# Patient Record
Sex: FEMALE | Race: ASIAN | Employment: FULL TIME | URBAN - METROPOLITAN AREA
[De-identification: names, ages, dates, MRNs, and addresses within clinical notes are randomized per-mention and may not be internally consistent; named-entity substitution may affect disease eponyms.]

---

## 2020-07-09 ENCOUNTER — HOSPITAL ENCOUNTER (OUTPATIENT)
Dept: PREADMISSION TESTING | Age: 50
Discharge: HOME | End: 2020-07-09
Payer: COMMERCIAL

## 2020-07-09 PROCEDURE — 87635 SARS-COV-2 COVID-19 AMP PRB: CPT

## 2020-07-10 ENCOUNTER — ANESTHESIA EVENT (OUTPATIENT)
Dept: SURGERY | Age: 50
End: 2020-07-10
Payer: COMMERCIAL

## 2020-07-11 LAB — SARS-COV-2, COV2NT: NOT DETECTED

## 2020-07-13 ENCOUNTER — HOSPITAL ENCOUNTER (OUTPATIENT)
Age: 50
Setting detail: OUTPATIENT SURGERY
Discharge: HOME | End: 2020-07-13
Attending: OBSTETRICS & GYNECOLOGY | Admitting: OBSTETRICS & GYNECOLOGY
Payer: COMMERCIAL

## 2020-07-13 ENCOUNTER — ANESTHESIA (OUTPATIENT)
Dept: SURGERY | Age: 50
End: 2020-07-13
Payer: COMMERCIAL

## 2020-07-13 VITALS
DIASTOLIC BLOOD PRESSURE: 69 MMHG | OXYGEN SATURATION: 100 % | BODY MASS INDEX: 26.45 KG/M2 | WEIGHT: 149.25 LBS | RESPIRATION RATE: 16 BRPM | HEART RATE: 63 BPM | SYSTOLIC BLOOD PRESSURE: 120 MMHG | HEIGHT: 63 IN | TEMPERATURE: 96.5 F

## 2020-07-13 PROBLEM — R93.89 THICKENED ENDOMETRIUM: Status: ACTIVE | Noted: 2020-07-13

## 2020-07-13 LAB — HCG UR QL: NEGATIVE

## 2020-07-13 PROCEDURE — 81025 URINE PREGNANCY TEST: CPT

## 2020-07-13 PROCEDURE — 74011250637 HC RX REV CODE- 250/637: Performed by: SPECIALIST

## 2020-07-13 PROCEDURE — 77030012508 HC MSK AIRWY LMA AMBU -A: Performed by: SPECIALIST

## 2020-07-13 PROCEDURE — 88305 TISSUE EXAM BY PATHOLOGIST: CPT

## 2020-07-13 PROCEDURE — 74011000250 HC RX REV CODE- 250: Performed by: OBSTETRICS & GYNECOLOGY

## 2020-07-13 PROCEDURE — 74011250636 HC RX REV CODE- 250/636: Performed by: OBSTETRICS & GYNECOLOGY

## 2020-07-13 PROCEDURE — 77030040361 HC SLV COMPR DVT MDII -B: Performed by: OBSTETRICS & GYNECOLOGY

## 2020-07-13 PROCEDURE — 77030020782 HC GWN BAIR PAWS FLX 3M -B: Performed by: OBSTETRICS & GYNECOLOGY

## 2020-07-13 PROCEDURE — 74011000250 HC RX REV CODE- 250: Performed by: SPECIALIST

## 2020-07-13 PROCEDURE — 76060000032 HC ANESTHESIA 0.5 TO 1 HR: Performed by: OBSTETRICS & GYNECOLOGY

## 2020-07-13 PROCEDURE — 74011250636 HC RX REV CODE- 250/636: Performed by: SPECIALIST

## 2020-07-13 PROCEDURE — 77030018836 HC SOL IRR NACL ICUM -A: Performed by: OBSTETRICS & GYNECOLOGY

## 2020-07-13 PROCEDURE — 76010000138 HC OR TIME 0.5 TO 1 HR: Performed by: OBSTETRICS & GYNECOLOGY

## 2020-07-13 PROCEDURE — 77030019927 HC TBNG IRR CYSTO BAXT -A: Performed by: OBSTETRICS & GYNECOLOGY

## 2020-07-13 PROCEDURE — 76210000006 HC OR PH I REC 0.5 TO 1 HR: Performed by: OBSTETRICS & GYNECOLOGY

## 2020-07-13 PROCEDURE — 76210000021 HC REC RM PH II 0.5 TO 1 HR: Performed by: OBSTETRICS & GYNECOLOGY

## 2020-07-13 RX ORDER — MAGNESIUM SULFATE 100 %
4 CRYSTALS MISCELLANEOUS AS NEEDED
Status: DISCONTINUED | OUTPATIENT
Start: 2020-07-13 | End: 2020-07-13 | Stop reason: HOSPADM

## 2020-07-13 RX ORDER — SODIUM CHLORIDE 9 MG/ML
125 INJECTION, SOLUTION INTRAVENOUS CONTINUOUS
Status: DISCONTINUED | OUTPATIENT
Start: 2020-07-13 | End: 2020-07-13 | Stop reason: HOSPADM

## 2020-07-13 RX ORDER — OXYCODONE AND ACETAMINOPHEN 5; 325 MG/1; MG/1
1 TABLET ORAL AS NEEDED
Status: DISCONTINUED | OUTPATIENT
Start: 2020-07-13 | End: 2020-07-13 | Stop reason: HOSPADM

## 2020-07-13 RX ORDER — KETOROLAC TROMETHAMINE 30 MG/ML
30 INJECTION, SOLUTION INTRAMUSCULAR; INTRAVENOUS
Status: DISCONTINUED | OUTPATIENT
Start: 2020-07-13 | End: 2020-07-13 | Stop reason: HOSPADM

## 2020-07-13 RX ORDER — FENTANYL CITRATE 50 UG/ML
50 INJECTION, SOLUTION INTRAMUSCULAR; INTRAVENOUS
Status: DISCONTINUED | OUTPATIENT
Start: 2020-07-13 | End: 2020-07-13 | Stop reason: HOSPADM

## 2020-07-13 RX ORDER — ACETAMINOPHEN 500 MG
1000 TABLET ORAL ONCE
Status: COMPLETED | OUTPATIENT
Start: 2020-07-13 | End: 2020-07-13

## 2020-07-13 RX ORDER — KETAMINE HCL IN 0.9 % NACL 50 MG/5 ML
SYRINGE (ML) INTRAVENOUS AS NEEDED
Status: DISCONTINUED | OUTPATIENT
Start: 2020-07-13 | End: 2020-07-13 | Stop reason: HOSPADM

## 2020-07-13 RX ORDER — MIDAZOLAM HYDROCHLORIDE 1 MG/ML
INJECTION, SOLUTION INTRAMUSCULAR; INTRAVENOUS AS NEEDED
Status: DISCONTINUED | OUTPATIENT
Start: 2020-07-13 | End: 2020-07-13 | Stop reason: HOSPADM

## 2020-07-13 RX ORDER — ONDANSETRON 2 MG/ML
4 INJECTION INTRAMUSCULAR; INTRAVENOUS ONCE
Status: COMPLETED | OUTPATIENT
Start: 2020-07-13 | End: 2020-07-13

## 2020-07-13 RX ORDER — PROPOFOL 10 MG/ML
INJECTION, EMULSION INTRAVENOUS AS NEEDED
Status: DISCONTINUED | OUTPATIENT
Start: 2020-07-13 | End: 2020-07-13 | Stop reason: HOSPADM

## 2020-07-13 RX ORDER — DEXTROSE MONOHYDRATE 100 MG/ML
125-250 INJECTION, SOLUTION INTRAVENOUS AS NEEDED
Status: DISCONTINUED | OUTPATIENT
Start: 2020-07-13 | End: 2020-07-13 | Stop reason: HOSPADM

## 2020-07-13 RX ORDER — LIDOCAINE HYDROCHLORIDE 20 MG/ML
INJECTION, SOLUTION EPIDURAL; INFILTRATION; INTRACAUDAL; PERINEURAL AS NEEDED
Status: DISCONTINUED | OUTPATIENT
Start: 2020-07-13 | End: 2020-07-13 | Stop reason: HOSPADM

## 2020-07-13 RX ORDER — SODIUM CHLORIDE 0.9 % (FLUSH) 0.9 %
5-40 SYRINGE (ML) INJECTION EVERY 8 HOURS
Status: CANCELLED | OUTPATIENT
Start: 2020-07-13

## 2020-07-13 RX ORDER — FENTANYL CITRATE 50 UG/ML
25 INJECTION, SOLUTION INTRAMUSCULAR; INTRAVENOUS AS NEEDED
Status: DISCONTINUED | OUTPATIENT
Start: 2020-07-13 | End: 2020-07-13 | Stop reason: HOSPADM

## 2020-07-13 RX ORDER — CELECOXIB 100 MG/1
100 CAPSULE ORAL ONCE
Status: COMPLETED | OUTPATIENT
Start: 2020-07-13 | End: 2020-07-13

## 2020-07-13 RX ORDER — SODIUM CHLORIDE 0.9 % (FLUSH) 0.9 %
5-40 SYRINGE (ML) INJECTION AS NEEDED
Status: CANCELLED | OUTPATIENT
Start: 2020-07-13

## 2020-07-13 RX ORDER — NALOXONE HYDROCHLORIDE 0.4 MG/ML
0.1 INJECTION, SOLUTION INTRAMUSCULAR; INTRAVENOUS; SUBCUTANEOUS AS NEEDED
Status: DISCONTINUED | OUTPATIENT
Start: 2020-07-13 | End: 2020-07-13 | Stop reason: HOSPADM

## 2020-07-13 RX ORDER — HYDROMORPHONE HYDROCHLORIDE 1 MG/ML
0.2 INJECTION, SOLUTION INTRAMUSCULAR; INTRAVENOUS; SUBCUTANEOUS
Status: DISCONTINUED | OUTPATIENT
Start: 2020-07-13 | End: 2020-07-13 | Stop reason: HOSPADM

## 2020-07-13 RX ORDER — GLYCOPYRROLATE 0.2 MG/ML
INJECTION INTRAMUSCULAR; INTRAVENOUS AS NEEDED
Status: DISCONTINUED | OUTPATIENT
Start: 2020-07-13 | End: 2020-07-13 | Stop reason: HOSPADM

## 2020-07-13 RX ORDER — SODIUM CHLORIDE, SODIUM LACTATE, POTASSIUM CHLORIDE, CALCIUM CHLORIDE 600; 310; 30; 20 MG/100ML; MG/100ML; MG/100ML; MG/100ML
125 INJECTION, SOLUTION INTRAVENOUS CONTINUOUS
Status: DISCONTINUED | OUTPATIENT
Start: 2020-07-13 | End: 2020-07-13 | Stop reason: HOSPADM

## 2020-07-13 RX ORDER — DEXAMETHASONE SODIUM PHOSPHATE 4 MG/ML
8 INJECTION, SOLUTION INTRA-ARTICULAR; INTRALESIONAL; INTRAMUSCULAR; INTRAVENOUS; SOFT TISSUE ONCE
Status: COMPLETED | OUTPATIENT
Start: 2020-07-13 | End: 2020-07-13

## 2020-07-13 RX ORDER — SILVER NITRATE 38.21; 12.74 MG/1; MG/1
STICK TOPICAL AS NEEDED
Status: DISCONTINUED | OUTPATIENT
Start: 2020-07-13 | End: 2020-07-13 | Stop reason: HOSPADM

## 2020-07-13 RX ADMIN — LIDOCAINE HYDROCHLORIDE 40 MG: 20 INJECTION, SOLUTION EPIDURAL; INFILTRATION; INTRACAUDAL; PERINEURAL at 12:00

## 2020-07-13 RX ADMIN — SODIUM CHLORIDE, SODIUM LACTATE, POTASSIUM CHLORIDE, AND CALCIUM CHLORIDE 125 ML/HR: 600; 310; 30; 20 INJECTION, SOLUTION INTRAVENOUS at 09:55

## 2020-07-13 RX ADMIN — PROPOFOL 100 MG: 10 INJECTION, EMULSION INTRAVENOUS at 11:58

## 2020-07-13 RX ADMIN — Medication 10 MG: at 11:56

## 2020-07-13 RX ADMIN — ACETAMINOPHEN 1000 MG: 500 TABLET ORAL at 10:01

## 2020-07-13 RX ADMIN — PROPOFOL 20 MG: 10 INJECTION, EMULSION INTRAVENOUS at 11:56

## 2020-07-13 RX ADMIN — PROPOFOL 40 MG: 10 INJECTION, EMULSION INTRAVENOUS at 12:00

## 2020-07-13 RX ADMIN — GLYCOPYRROLATE 0.2 MG: 0.2 INJECTION INTRAMUSCULAR; INTRAVENOUS at 11:48

## 2020-07-13 RX ADMIN — MIDAZOLAM 2 MG: 1 INJECTION INTRAMUSCULAR; INTRAVENOUS at 11:48

## 2020-07-13 RX ADMIN — Medication 10 MG: at 11:58

## 2020-07-13 RX ADMIN — DEXAMETHASONE SODIUM PHOSPHATE 8 MG: 4 INJECTION, SOLUTION INTRAMUSCULAR; INTRAVENOUS at 10:01

## 2020-07-13 RX ADMIN — CELECOXIB 100 MG: 100 CAPSULE ORAL at 10:01

## 2020-07-13 RX ADMIN — LIDOCAINE HYDROCHLORIDE 20 MG: 20 INJECTION, SOLUTION EPIDURAL; INFILTRATION; INTRACAUDAL; PERINEURAL at 11:56

## 2020-07-13 RX ADMIN — LIDOCAINE HYDROCHLORIDE 100 MG: 20 INJECTION, SOLUTION EPIDURAL; INFILTRATION; INTRACAUDAL; PERINEURAL at 11:58

## 2020-07-13 RX ADMIN — ONDANSETRON 4 MG: 2 INJECTION INTRAMUSCULAR; INTRAVENOUS at 12:50

## 2020-07-13 NOTE — DISCHARGE INSTRUCTIONS
Patient Education        9 Things To Do If You've Been Exposed to COVID-19    Stay home. If you've been exposed, you should stay in isolation for 14 days. Don't go to school, work, or public areas. And don't use public transportation, ride-shares, or taxis unless you have no choice. Leave your home only if you need to get medical care. But call the doctor's office first so they know you're coming, and wear a cloth face cover when you go. Call your doctor. Call your doctor or other health professional to let them know that you've been exposed. They might want you to be tested, or they may have other instructions for you. If you become sick, wear a face cover when you are around other people. It can help stop the spread of the virus when you cough or sneeze. Limit contact with people in your home. If possible, stay in a separate bedroom and use a separate bathroom. Avoid contact with pets and other animals. Cover your mouth and nose with a tissue when you cough or sneeze. Then throw it in the trash right away. Wash your hands often, especially after you cough or sneeze. Use soap and water, and scrub for at least 20 seconds. If soap and water aren't available, use an alcohol-based hand . Don't share personal household items. These include bedding, towels, cups and glasses, and eating utensils. Clean and disinfect your home every day. Use household  or disinfectant wipes or sprays. Take special care to clean things that you grab with your hands. These include doorknobs, remote controls, phones, and handles on your refrigerator and microwave. And don't forget countertops, tabletops, bathrooms, and computer keyboards. Current as of: May 8, 2020               Content Version: 12.5  © 2006-2020 [employer]. Care instructions adapted under license by [employer] (which disclaims liability or warranty for this information).  If you have questions about a medical condition or this instruction, always ask your healthcare professional. [address] any warranty or liability for your use of this information. DISCHARGE SUMMARY from Nurse    PATIENT INSTRUCTIONS:    After general anesthesia or intravenous sedation, for 24 hours or while taking prescription Narcotics:  · Limit your activities  · Do not drive and operate hazardous machinery  · Do not make important personal or business decisions  · Do  not drink alcoholic beverages  · If you have not urinated within 8 hours after discharge, please contact your surgeon on call. Report the following to your surgeon:  · Excessive pain, swelling, redness or odor of or around the surgical area  · Temperature over 100.5  · Nausea and vomiting lasting longer than 4 hours or if unable to take medications  · Any signs of decreased circulation or nerve impairment to extremity: change in color, persistent  numbness, tingling, coldness or increase pain  · Any questions    What to do at Home:  [facility] 2 WEEKS  RETURN TO OFFICE IN 2 WEEKS CALL FOR APPT    If you experience any of the following symptoms heavy bleeding, fevers, severe pain, please follow up with dr Beverley Phillips    *  Please give a list of your current medications to your Primary Care Provider. *  Please update this list whenever your medications are discontinued, doses are      changed, or new medications (including over-the-counter products) are added. *  Please carry medication information at all times in case of emergency situations. These are general instructions for a healthy lifestyle:    No smoking/ No tobacco products/ Avoid exposure to second hand smoke  Surgeon General's Warning:  Quitting smoking now greatly reduces serious risk to your health.     Obesity, smoking, and sedentary lifestyle greatly increases your risk for illness    A healthy diet, regular physical exercise & weight monitoring are important for maintaining a healthy lifestyle    You may be retaining fluid if you have a history of heart failure or if you experience any of the following symptoms:  Weight gain of 3 pounds or more overnight or 5 pounds in a week, increased swelling in our hands or feet or shortness of breath while lying flat in bed. Please call your doctor as soon as you notice any of these symptoms; do not wait until your next office visit. The discharge information has been reviewed with the patient and caregiver. The patient and caregiver verbalized understanding. Discharge medications reviewed with the patient and caregiver and appropriate educational materials and side effects teaching were provided.   ___________________________________________________________________________________________________________________________________    Patient armband removed and shredded

## 2020-07-13 NOTE — PERIOP NOTES
Spoke with pt daughter via cell phone, pt states she does not have any pain or nausea and does not have any questions

## 2020-07-13 NOTE — PERIOP NOTES
TRANSFER - OUT REPORT:    Verbal report given to Bahman Santiago RN (name) on Zygmunt Ohm  being transferred to phase 2(unit) for routine post - op       Report consisted of patients Situation, Background, Assessment and   Recommendations(SBAR). Information from the following report(s) SBAR, Intake/Output and MAR was reviewed with the receiving nurse. Lines:   Peripheral IV 07/13/20 Left; Inner Forearm (Active)   Site Assessment Clean, dry, & intact 07/13/20 1233   Phlebitis Assessment 0 07/13/20 1233   Infiltration Assessment 0 07/13/20 1233   Dressing Status Clean, dry, & intact 07/13/20 1233   Dressing Type Transparent;Tape 07/13/20 1233   Hub Color/Line Status Pink; Infusing 07/13/20 1233   Alcohol Cap Used No 07/13/20 0956        Opportunity for questions and clarification was provided.       Patient transported with:   [employer]

## 2020-07-13 NOTE — PERIOP NOTES
Reviewed PTA medication list with patient/caregiver and patient/caregiver denies any additional medications. Patient admits to having a responsible adult care for them at home for at least 24 hours after surgery. Patient encouraged to use gown warming system and informed that using said warming gown to regulate body temperature prior to a procedure has been shown to help reduce the risks of blood clots and infection. Dual skin assessment & fall risk band verification completed with Karlene Figueroa RN.

## 2020-07-13 NOTE — INTERVAL H&P NOTE
Update History & Physical 
 
The Patient's History and Physical of July 09, 2020 was reviewed with the patient and I examined the patient. There was no change. The surgical site was confirmed by the patient and me. Plan:  The risk, benefits, expected outcome, and alternative to the recommended procedure have been discussed with the patient. Patient understands and wants to proceed with the procedure.  
 
Electronically signed by Kenyatta Brooks MD on 7/13/2020 at 11:27 AM

## 2020-07-13 NOTE — PERIOP NOTES
Family member said Dr. Carlyle Skiff wants pt to take ibuprofen as directed for pain per phone conversation

## 2020-07-13 NOTE — OP NOTES
Outpatient Operative Note     Surgeon(s): Niesha Nolan MD  Assistant:  None  Pre-operative Diagnosis: Thickened endometrium, Abnormal uterine bleeding, cervical stenosis  Post-operative Diagnosis: same as preop diagnosis. Procedure(s) Performed: Dilation & Curettage,  Diagnostic Hysteroscopy     Anesthesia: General with LMA  Findings:  No distinct polyp noted. Atrophic appearing endometrium  Complications: none   Estimated Blood Loss: 5 ml  Specimens: endometrial curettings   Implants:  None    Procedure: The patient was taken to the operating room where she was placed in the supine position. After general anesthesia was initiated, she was repositioned into the [facility] laparoscopy [facility] in the dorsal lithotomy position. The patient was then prepped and draped in the usual sterile fashion. Attention was first turned to the vagina where the bivalved speculum was placed. The anterior lip of the cervix was grasped with a single-toothed tenaculum. The cervix was dilated to admit a diagnostic hysteroscope using normal saline for distention media. The above findings were noted. Using a curet, a gentle curettage was performed. The hysteroscope was again advanced into the uterine cavity which was noted to be clean. All instruments were then removed from the vagina. Good hemostasis was noted. All sponge counts were correct. The patient was awakened, extubated and taken to the recovery room in good condition.      Niseha Nolan MD

## 2020-07-13 NOTE — ANESTHESIA PREPROCEDURE EVALUATION
Relevant Problems   No relevant active problems       Anesthetic History   No history of anesthetic complications     Pertinent negatives: No PONV       Review of Systems / Medical History  Patient summary reviewed, nursing notes reviewed and pertinent labs reviewed    Pulmonary  Within defined limits            Pertinent negatives: No smoker     Neuro/Psych   Within defined limits           Cardiovascular  Within defined limits              Pertinent negatives: No hypertension, past MI and CAD       GI/Hepatic/Renal  Within defined limits           Pertinent negatives: No GERD, liver disease and renal disease   Endo/Other  Within defined limits        Pertinent negatives: No diabetes   Other Findings   Comments: etoh neg           Physical Exam    Airway  Mallampati: II  TM Distance: 4 - 6 cm  Neck ROM: normal range of motion   Mouth opening: Normal     Cardiovascular               Dental    Dentition: Caps/crowns     Pulmonary                 Abdominal         Other Findings            Anesthetic Plan    ASA: 1  Anesthesia type: general          Induction: Intravenous  Anesthetic plan and risks discussed with: Patient

## 2020-07-14 NOTE — ANESTHESIA POSTPROCEDURE EVALUATION
Procedure(s):  DILATATION AND CURETTAGE HYSTEROSCOPY. general    Anesthesia Post Evaluation        Comments: Post-Anesthesia Evaluation and Assessment    Cardiovascular Function/Vital Signs  /69   Pulse 63   Temp (!) 35.8 °C (96.5 °F)   Resp 16   Ht 5' 3\" (1.6 m)   Wt 67.7 kg (149 lb 4 oz)   SpO2 100%   BMI 26.44 kg/m²     Patient is status post Procedure(s):  DILATATION AND CURETTAGE HYSTEROSCOPY. Nausea/Vomiting: Controlled. Postoperative hydration reviewed and adequate. Pain:  Pain Scale 1: Numeric (0 - 10) (07/13/20 1407)  Pain Intensity 1: 0 (07/13/20 1407)   Managed. Neurological Status:   Neuro (WDL): Within Defined Limits (07/13/20 1324)   At baseline. Mental Status and Level of Consciousness: Arousable. Pulmonary Status:   O2 Device: Room air (07/13/20 1324)   Adequate oxygenation and airway patent. Complications related to anesthesia: None    Post-anesthesia assessment completed. No concerns. Patient has met all discharge requirements. Signed By: Helen Stevens MD    July 14, 2020                     INITIAL Post-op Vital signs:   Vitals Value Taken Time   /61 7/13/2020  1:10 PM   Temp 36.6 °C (97.9 °F) 7/13/2020 12:33 PM   Pulse 62 7/13/2020  1:14 PM   Resp 17 7/13/2020  1:14 PM   SpO2 100 % 7/13/2020  1:14 PM   Vitals shown include unvalidated device data.

## (undated) DEVICE — STERILE POLYISOPRENE POWDER-FREE SURGICAL GLOVES: Brand: PROTEXIS

## (undated) DEVICE — STERILE POLYISOPRENE POWDER-FREE SURGICAL GLOVES WITH EMOLLIENT COATING: Brand: PROTEXIS

## (undated) DEVICE — D&C HYSTEROSCOPY: Brand: MEDLINE INDUSTRIES, INC.

## (undated) DEVICE — GARMENT,MEDLINE,DVT,INT,CALF,MED, GEN2: Brand: MEDLINE

## (undated) DEVICE — SOLUTION IRRIG 3000ML 0.9% SOD CHL FLX CONT 0797208] ICU MEDICAL INC]

## (undated) DEVICE — GOWN,AURORA,NONRNF,XL,30/CS: Brand: MEDLINE

## (undated) DEVICE — SOLUTION LACTATED RINGERS INJECTION USP

## (undated) DEVICE — CYSTO/BLADDER IRRIGATION SET, REGULATING CLAMP